# Patient Record
(demographics unavailable — no encounter records)

---

## 2025-07-23 NOTE — ASSESSMENT
[FreeTextEntry1] : Retention: failed TOV start double flomax f/u 2 weeks for repeat TOV  High blood pressure: advised to go ER - pt refused advised if he has dizziness, chest pain, headache or slurred speech to go ER. Discussed risk of stroke.  advised to find primary care doc

## 2025-07-23 NOTE — PHYSICAL EXAM
[Normal Appearance] : normal appearance [Well Groomed] : well groomed [General Appearance - In No Acute Distress] : no acute distress [Edema] : no peripheral edema [Respiration, Rhythm And Depth] : normal respiratory rhythm and effort [Normal Station and Gait] : the gait and station were normal for the patient's age [] : no rash [No Focal Deficits] : no focal deficits [Oriented To Time, Place, And Person] : oriented to person, place, and time [Affect] : the affect was normal [Mood] : the mood was normal

## 2025-07-23 NOTE — HISTORY OF PRESENT ILLNESS
[FreeTextEntry1] : 71yo M presents after recent urinary retention 1 week ago He reports prior to retention, he had slow stream and nocturia 2x.   Bladder Renal US 7/15/2025: No stones or hydro. prostate 106g UA neg on 7/15/2025  /81